# Patient Record
Sex: MALE | Race: OTHER | HISPANIC OR LATINO
[De-identification: names, ages, dates, MRNs, and addresses within clinical notes are randomized per-mention and may not be internally consistent; named-entity substitution may affect disease eponyms.]

---

## 2020-02-28 DIAGNOSIS — Z87.19 PERSONAL HISTORY OF OTHER DISEASES OF THE DIGESTIVE SYSTEM: ICD-10-CM

## 2020-02-28 DIAGNOSIS — M50.90 CERVICAL DISC DISORDER, UNSPECIFIED, UNSPECIFIED CERVICAL REGION: ICD-10-CM

## 2020-02-28 DIAGNOSIS — N52.9 MALE ERECTILE DYSFUNCTION, UNSPECIFIED: ICD-10-CM

## 2020-02-28 DIAGNOSIS — R91.1 SOLITARY PULMONARY NODULE: ICD-10-CM

## 2020-02-28 DIAGNOSIS — E27.8 OTHER SPECIFIED DISORDERS OF ADRENAL GLAND: ICD-10-CM

## 2020-02-28 DIAGNOSIS — Z85.46 PERSONAL HISTORY OF MALIGNANT NEOPLASM OF PROSTATE: ICD-10-CM

## 2020-02-28 DIAGNOSIS — E03.9 HYPOTHYROIDISM, UNSPECIFIED: ICD-10-CM

## 2020-02-28 DIAGNOSIS — K21.9 GASTRO-ESOPHAGEAL REFLUX DISEASE W/OUT ESOPHAGITIS: ICD-10-CM

## 2020-03-02 ENCOUNTER — APPOINTMENT (OUTPATIENT)
Dept: CARDIOLOGY | Facility: CLINIC | Age: 60
End: 2020-03-02
Payer: COMMERCIAL

## 2020-03-02 VITALS
HEIGHT: 70 IN | DIASTOLIC BLOOD PRESSURE: 70 MMHG | WEIGHT: 231 LBS | SYSTOLIC BLOOD PRESSURE: 128 MMHG | HEART RATE: 69 BPM | BODY MASS INDEX: 33.07 KG/M2

## 2020-03-02 DIAGNOSIS — Z83.3 FAMILY HISTORY OF DIABETES MELLITUS: ICD-10-CM

## 2020-03-02 DIAGNOSIS — E78.5 HYPERLIPIDEMIA, UNSPECIFIED: ICD-10-CM

## 2020-03-02 DIAGNOSIS — Z00.00 ENCOUNTER FOR GENERAL ADULT MEDICAL EXAMINATION W/OUT ABNORMAL FINDINGS: ICD-10-CM

## 2020-03-02 DIAGNOSIS — Z82.49 FAMILY HISTORY OF ISCHEMIC HEART DISEASE AND OTHER DISEASES OF THE CIRCULATORY SYSTEM: ICD-10-CM

## 2020-03-02 DIAGNOSIS — Z80.9 FAMILY HISTORY OF MALIGNANT NEOPLASM, UNSPECIFIED: ICD-10-CM

## 2020-03-02 DIAGNOSIS — Z87.891 PERSONAL HISTORY OF NICOTINE DEPENDENCE: ICD-10-CM

## 2020-03-02 DIAGNOSIS — Z78.9 OTHER SPECIFIED HEALTH STATUS: ICD-10-CM

## 2020-03-02 PROCEDURE — 93000 ELECTROCARDIOGRAM COMPLETE: CPT

## 2020-03-02 PROCEDURE — 99214 OFFICE O/P EST MOD 30 MIN: CPT

## 2020-03-02 RX ORDER — FAMOTIDINE 20 MG/1
20 TABLET, FILM COATED ORAL
Refills: 0 | Status: ACTIVE | COMMUNITY

## 2020-03-02 RX ORDER — OBINUTUZUMAB 1000 MG/40ML
INJECTION, SOLUTION, CONCENTRATE INTRAVENOUS
Refills: 0 | Status: ACTIVE | COMMUNITY

## 2020-03-02 RX ORDER — ALLOPURINOL 300 MG/1
300 TABLET ORAL DAILY
Refills: 0 | Status: ACTIVE | COMMUNITY

## 2020-03-02 RX ORDER — VENETOCLAX 100 MG/1
100 TABLET, FILM COATED ORAL DAILY
Refills: 0 | Status: ACTIVE | COMMUNITY

## 2020-03-02 NOTE — HISTORY OF PRESENT ILLNESS
[FreeTextEntry1] : Mr Balbuena is referred by Dr Gurrola after an er visit  1/4/2020 for chest pain. He has CLL and had a significant rise in his WBC count to >200K and was given prednisone pre therapy and had chest and arm pain that day. He has been on therapy for 5 weeks now.  Since that day he gets a  feeling in his mid chest , nonexertional,midsternal , like a "swelling", lasts seconds and is resolved spontaneously. He has occasional palpitations, no syncope. He was dyspneic when his WBC count was elevated.\par He exercises without difficulty.

## 2020-03-11 ENCOUNTER — APPOINTMENT (OUTPATIENT)
Dept: CARDIOLOGY | Facility: CLINIC | Age: 60
End: 2020-03-11
Payer: COMMERCIAL

## 2020-03-11 VITALS — HEART RATE: 93 BPM | DIASTOLIC BLOOD PRESSURE: 86 MMHG | SYSTOLIC BLOOD PRESSURE: 150 MMHG

## 2020-03-11 DIAGNOSIS — R07.9 CHEST PAIN, UNSPECIFIED: ICD-10-CM

## 2020-03-11 DIAGNOSIS — R03.0 ELEVATED BLOOD-PRESSURE READING, W/OUT DIAGNOSIS OF HYPERTENSION: ICD-10-CM

## 2020-03-11 PROCEDURE — 93015 CV STRESS TEST SUPVJ I&R: CPT

## 2020-03-11 PROCEDURE — 99213 OFFICE O/P EST LOW 20 MIN: CPT | Mod: 25

## 2020-03-11 NOTE — PHYSICAL EXAM
[Normal Appearance] : normal appearance [General Appearance - In No Acute Distress] : no acute distress [FreeTextEntry1] : flushed, warm to touch [Heart Sounds] : normal S1 and S2 [Edema] : no peripheral edema present [Abnormal Walk] : normal gait [Skin Color & Pigmentation] : normal skin color and pigmentation

## 2020-03-11 NOTE — REVIEW OF SYSTEMS
[Recent Weight Gain (___ Lbs)] : no recent weight gain [Recent Weight Loss (___ Lbs)] : no recent weight loss [see HPI] : see HPI [Shortness Of Breath] : no shortness of breath [Dyspnea on exertion] : not dyspnea during exertion [Chest Pain] : no chest pain [Lower Ext Edema] : no extremity edema [Palpitations] : no palpitations [Cough] : no cough [Easy Bleeding] : no tendency for easy bleeding [Easy Bruising] : no tendency for easy bruising [Negative] : Musculoskeletal

## 2020-03-11 NOTE — REASON FOR VISIT
[Chest Pain] : chest pain [FreeTextEntry1] : Returns today for stress test, previous c/o non exertional CP\par s/p monthly treatment with ivenutuzumab for CLL yesterday, notes feeling flushed with elevated HR and BP.  Feels this was frequently after treatment.\par There has been no further CP, denies SOB

## 2020-03-11 NOTE — HISTORY OF PRESENT ILLNESS
[FreeTextEntry1] : Mr Balbuena is referred by Dr Gurrola after an er visit  1/4/2020 for chest pain. He has CLL and had a significant rise in his WBC count to >200K and was given prednisone pre therapy and had chest and arm pain that day. He has been on therapy for 5 weeks now.

## 2020-03-31 ENCOUNTER — RX CHANGE (OUTPATIENT)
Age: 60
End: 2020-03-31

## 2020-04-01 ENCOUNTER — RX CHANGE (OUTPATIENT)
Age: 60
End: 2020-04-01

## 2020-08-18 ENCOUNTER — APPOINTMENT (OUTPATIENT)
Dept: GASTROENTEROLOGY | Facility: CLINIC | Age: 60
End: 2020-08-18
Payer: COMMERCIAL

## 2020-08-18 PROCEDURE — 99442: CPT

## 2020-08-18 NOTE — HISTORY OF PRESENT ILLNESS
[FreeTextEntry1] : telephonic visit-consent on file\par This gentleman E. 60 is here for telephonic visit because of a history of several adenomatous polyps, in 2015, with a smaller polyp at 15 cm and a 1 cm flat polyp at 50 cm. Both were removed\par  \par Patient...had negative colonoscopy in November 2017.\par I recommended a followup colonoscopy within 2.5 years which would make it may of 2020. It got delayed because of the cold epidemic.\par Patient has chronic lymphocytic leukemia and he is under therapy by Dr. Junior Disla at [facility]\par \par He has history of Robotic Radical Prostatectomy for Prostate Cancer\par \par for CLL, he is on one iv med by infusion, which he has already received, and an oral med, and his counts have been nl\par \par takes 25 mg losartan for hyepertension, and levothyroxin

## 2020-08-18 NOTE — ASSESSMENT
[FreeTextEntry1] : 1.  history of colon polyps\par \par 2.  father; colon cancer\par \par 3.  CLL\par \par 4.  Hx of Prostate Ca, rx with radical robotic prostatectomy\par \par plan\par set up colon\par take bp med am of exam \par \par will review prep with K.\par \par More than 50% of the face to face time was devoted to counseling and /or coordination of care.  THis coordination of care may have included reviewing other medical notes and reports, and communicating with other health professionals\par

## 2020-08-25 ENCOUNTER — RESULT REVIEW (OUTPATIENT)
Age: 60
End: 2020-08-25

## 2020-08-28 ENCOUNTER — APPOINTMENT (OUTPATIENT)
Dept: GASTROENTEROLOGY | Facility: HOSPITAL | Age: 60
End: 2020-08-28

## 2020-09-24 ENCOUNTER — RESULT REVIEW (OUTPATIENT)
Age: 60
End: 2020-09-24

## 2020-10-07 ENCOUNTER — RX RENEWAL (OUTPATIENT)
Age: 60
End: 2020-10-07

## 2020-10-07 RX ORDER — LOSARTAN POTASSIUM 25 MG/1
25 TABLET, FILM COATED ORAL DAILY
Qty: 180 | Refills: 3 | Status: ACTIVE | COMMUNITY
Start: 2020-03-16 | End: 1900-01-01

## 2021-04-09 ENCOUNTER — RX RENEWAL (OUTPATIENT)
Age: 61
End: 2021-04-09

## 2021-04-14 ENCOUNTER — RX RENEWAL (OUTPATIENT)
Age: 61
End: 2021-04-14

## 2021-04-15 ENCOUNTER — RESULT REVIEW (OUTPATIENT)
Age: 61
End: 2021-04-15

## 2023-01-31 ENCOUNTER — APPOINTMENT (OUTPATIENT)
Dept: GASTROENTEROLOGY | Facility: CLINIC | Age: 63
End: 2023-01-31
Payer: COMMERCIAL

## 2023-01-31 PROCEDURE — 99443: CPT

## 2023-01-31 NOTE — ASSESSMENT
[FreeTextEntry1] : patient may need colonoscopy;  but he is not quite due yet\par patient also has several other med issues, CLL particularly, but this is stable, and under control, being observed without current treatment\par \par no new meds\par \par plan\par start with culture, c diff, o and p\par \par and then fecal calpro\par stool elastase, \par \par and try a different probiotic\par try Culturelle for ibs\par \par more dietary fiber..\par \par and try IBgard, two capsules with each meal, so six to nine per day

## 2023-01-31 NOTE — HISTORY OF PRESENT ILLNESS
[FreeTextEntry1] : Telephonic visit, audio only, just patient and I, consent on file, he is at his home address\par I am in my office\par [address] [place] [address]\par \par Problem #1: Change in bowel habits\par Patient has noticed since September 2022 a change in his bowel function.  His stools which had been rather normal, since a COVID infection of mild to moderate degree treated by pack Slo-Bid back in September, has been experiencing increased frequency and occasionally urgency and gaseousness of his bowel movements, which tend to be soft semiformed sometimes formed and sometimes frankly loose, yellowish color, sometimes with urgency but not blood.  Occasionally the stools are normal and formed\par \par There is no nocturnal diarrhea\par \par He tried probiotics from [facility] but they did not help\par  he was diagnosed with CLL back in about [id number];  was [id number] on a protocol for approximately six months, now is just being observed, and is doing well\par \par he is also a survivor of prostate cancer, had radical prostatectomy in 2012, and PSA level is undetectable\par \par no new meds\par he is on crestor for six months,, and losartan for blood pressure.\par \par and thyroid

## 2023-02-07 LAB
ALBUMIN SERPL ELPH-MCNC: 4.7 G/DL
ALP BLD-CCNC: 59 U/L
ALT SERPL-CCNC: 23 U/L
ANION GAP SERPL CALC-SCNC: 14 MMOL/L
AST SERPL-CCNC: 18 U/L
BASOPHILS # BLD AUTO: 0.06 K/UL
BASOPHILS NFR BLD AUTO: 0.7 %
BILIRUB SERPL-MCNC: 0.5 MG/DL
BUN SERPL-MCNC: 19 MG/DL
CALCIUM SERPL-MCNC: 9.4 MG/DL
CHLORIDE SERPL-SCNC: 103 MMOL/L
CO2 SERPL-SCNC: 24 MMOL/L
CREAT SERPL-MCNC: 1.33 MG/DL
CRP SERPL-MCNC: <3 MG/L
EGFR: 60 ML/MIN/1.73M2
ENDOMYSIUM IGA SER QL: NEGATIVE
ENDOMYSIUM IGA TITR SER: NORMAL
EOSINOPHIL # BLD AUTO: 0.1 K/UL
EOSINOPHIL NFR BLD AUTO: 1.1 %
ERYTHROCYTE [SEDIMENTATION RATE] IN BLOOD BY WESTERGREN METHOD: 5 MM/HR
GLUCOSE SERPL-MCNC: 133 MG/DL
HCT VFR BLD CALC: 44.8 %
HGB BLD-MCNC: 14.5 G/DL
IMM GRANULOCYTES NFR BLD AUTO: 0.4 %
LYMPHOCYTES # BLD AUTO: 1.43 K/UL
LYMPHOCYTES NFR BLD AUTO: 15.8 %
MAN DIFF?: NORMAL
MCHC RBC-ENTMCNC: 32.4 GM/DL
MCHC RBC-ENTMCNC: 32.4 PG
MCV RBC AUTO: 100 FL
MONOCYTES # BLD AUTO: 0.68 K/UL
MONOCYTES NFR BLD AUTO: 7.5 %
NEUTROPHILS # BLD AUTO: 6.73 K/UL
NEUTROPHILS NFR BLD AUTO: 74.5 %
PLATELET # BLD AUTO: 211 K/UL
POTASSIUM SERPL-SCNC: 4.2 MMOL/L
PROT SERPL-MCNC: 6.6 G/DL
RBC # BLD: 4.48 M/UL
RBC # FLD: 12.6 %
SODIUM SERPL-SCNC: 142 MMOL/L
TTG IGG SER IA-ACNC: <1.2 U/ML
TTG IGG SER IA-ACNC: NEGATIVE
WBC # FLD AUTO: 9.04 K/UL

## 2023-02-11 LAB — CDIFF BY PCR: NOT DETECTED

## 2023-02-12 ENCOUNTER — NON-APPOINTMENT (OUTPATIENT)
Age: 63
End: 2023-02-12

## 2023-02-12 LAB — BACTERIA STL CULT: NORMAL

## 2023-02-15 LAB — CALPROTECTIN FECAL: 25 UG/G

## 2023-02-22 LAB — PANCREATIC ELASTASE, FECAL: 147 CD:794062645

## 2023-04-24 ENCOUNTER — APPOINTMENT (OUTPATIENT)
Dept: GASTROENTEROLOGY | Facility: CLINIC | Age: 63
End: 2023-04-24
Payer: COMMERCIAL

## 2023-04-24 DIAGNOSIS — K86.89 OTHER SPECIFIED DISEASES OF PANCREAS: ICD-10-CM

## 2023-04-24 PROCEDURE — 99213 OFFICE O/P EST LOW 20 MIN: CPT | Mod: 95

## 2023-04-24 RX ORDER — PANCRELIPASE 30000; 6000; 19000 [USP'U]/1; [USP'U]/1; [USP'U]/1
6000-19000 CAPSULE, DELAYED RELEASE PELLETS ORAL 3 TIMES DAILY
Qty: 360 | Refills: 2 | Status: ACTIVE | COMMUNITY
Start: 2023-04-24 | End: 1900-01-01

## 2023-04-24 NOTE — HISTORY OF PRESENT ILLNESS
[FreeTextEntry1] : patient is improved somewhat, but still has abnl bowel movements, on the soft pasty side, two to three not like his bowel movements prior to his covid infection\par \par next year or in the next twelve months, patient will have a colonoscopy, august...\par \par

## 2023-04-24 NOTE — ASSESSMENT
[FreeTextEntry1] : 1.  We did we didpost covid diarrhea.\par \par \par we did an extensive work up for what may have been post covid diarrhea\par this included blood work, fecal calprotectin, stools for all infedctious agents, and finally blood work for celiac disease\par \par all of this was negative\par however, the fecal or stool elastase was mildly decreased at 147, and the normal is above 200\par \par this certainly brings up the possibility of a component of pancreatic exocrine insufficiency\par \par in addition to the patient taking one ibgard every other day, i am advising that he try two capsules of creon, fifteen minutes before meals.\par \par will not stop his ibgard even though he has gotten down to a low dose\par \par next visit should be in approx three months...can be telehealth..

## 2023-08-16 ENCOUNTER — APPOINTMENT (OUTPATIENT)
Dept: GASTROENTEROLOGY | Facility: CLINIC | Age: 63
End: 2023-08-16
Payer: COMMERCIAL

## 2023-08-16 DIAGNOSIS — K52.9 NONINFECTIVE GASTROENTERITIS AND COLITIS, UNSPECIFIED: ICD-10-CM

## 2023-08-16 DIAGNOSIS — C91.10 CHRONIC LYMPHOCYTIC LEUKEMIA OF B-CELL TYPE NOT HAVING ACHIEVED REMISSION: ICD-10-CM

## 2023-08-16 DIAGNOSIS — Z80.0 FAMILY HISTORY OF MALIGNANT NEOPLASM OF DIGESTIVE ORGANS: ICD-10-CM

## 2023-08-16 DIAGNOSIS — Z86.010 PERSONAL HISTORY OF COLONIC POLYPS: ICD-10-CM

## 2023-08-16 PROCEDURE — 99213 OFFICE O/P EST LOW 20 MIN: CPT | Mod: 95

## 2023-08-16 NOTE — HISTORY OF PRESENT ILLNESS
[FreeTextEntry1] : telephonic visit, consent on file, using the [employer] two way audio system telephonic and video  patient is at home i am at my office, [address], [place], [place]  [address].  change in bowel habits had occurred it seemed after covid infection stools became softer and more frequent now, going back towards normal with culturelle somewhat low stool elastase, but no resoonse to creon never took IBGard which we discussed still only one or two bms daily  fh o colon cancer, a parent also, he has hx of CLL, no longer on meds no cv dx  patient takes crestor thyroid h Losartan, in monring, one dose

## 2023-08-16 NOTE — ASSESSMENT
[FreeTextEntry1] : we will go ahead with colonoscopy   patient understands that we may do colon bx to rule out microscopic inflamm with multiple adenomatous polyps and change in bms and fh of colon ca he is due for this exam

## 2023-08-17 ENCOUNTER — RESULT REVIEW (OUTPATIENT)
Age: 63
End: 2023-08-17

## 2023-08-18 ENCOUNTER — APPOINTMENT (OUTPATIENT)
Dept: GASTROENTEROLOGY | Facility: HOSPITAL | Age: 63
End: 2023-08-18

## 2023-08-18 ENCOUNTER — TRANSCRIPTION ENCOUNTER (OUTPATIENT)
Age: 63
End: 2023-08-18